# Patient Record
Sex: MALE | Race: WHITE | NOT HISPANIC OR LATINO | Employment: UNEMPLOYED | URBAN - NONMETROPOLITAN AREA
[De-identification: names, ages, dates, MRNs, and addresses within clinical notes are randomized per-mention and may not be internally consistent; named-entity substitution may affect disease eponyms.]

---

## 2018-05-31 ENCOUNTER — APPOINTMENT (OUTPATIENT)
Dept: GENERAL RADIOLOGY | Facility: HOSPITAL | Age: 23
End: 2018-05-31

## 2018-05-31 ENCOUNTER — HOSPITAL ENCOUNTER (EMERGENCY)
Facility: HOSPITAL | Age: 23
Discharge: HOME | End: 2018-05-31
Attending: EMERGENCY MEDICINE | Admitting: EMERGENCY MEDICINE

## 2018-05-31 VITALS
BODY MASS INDEX: 19.26 KG/M2 | TEMPERATURE: 99 F | SYSTOLIC BLOOD PRESSURE: 122 MMHG | RESPIRATION RATE: 16 BRPM | HEIGHT: 69 IN | DIASTOLIC BLOOD PRESSURE: 75 MMHG | WEIGHT: 130 LBS | OXYGEN SATURATION: 99 % | HEART RATE: 65 BPM

## 2018-05-31 DIAGNOSIS — S62.002A CLOSED NONDISPLACED FRACTURE OF SCAPHOID OF LEFT WRIST, UNSPECIFIED PORTION OF SCAPHOID, INITIAL ENCOUNTER: Primary | ICD-10-CM

## 2018-05-31 PROCEDURE — 73110 X-RAY EXAM OF WRIST: CPT

## 2018-05-31 PROCEDURE — 73130 X-RAY EXAM OF HAND: CPT

## 2018-05-31 PROCEDURE — 99283 EMERGENCY DEPT VISIT LOW MDM: CPT

## 2018-05-31 PROCEDURE — 73080 X-RAY EXAM OF ELBOW: CPT

## 2018-05-31 RX ORDER — HYDROCODONE BITARTRATE AND ACETAMINOPHEN 7.5; 325 MG/1; MG/1
1 TABLET ORAL ONCE
Status: COMPLETED | OUTPATIENT
Start: 2018-05-31 | End: 2018-05-31

## 2018-05-31 RX ORDER — HYDROCODONE BITARTRATE AND ACETAMINOPHEN 5; 325 MG/1; MG/1
1 TABLET ORAL EVERY 6 HOURS PRN
Qty: 12 TABLET | Refills: 0 | Status: SHIPPED | OUTPATIENT
Start: 2018-05-31 | End: 2018-06-04 | Stop reason: SDUPTHER

## 2018-05-31 RX ADMIN — HYDROCODONE BITARTRATE AND ACETAMINOPHEN 1 TABLET: 7.5; 325 TABLET ORAL at 11:08

## 2018-06-04 ENCOUNTER — OFFICE VISIT (OUTPATIENT)
Dept: ORTHOPEDIC SURGERY | Facility: CLINIC | Age: 23
End: 2018-06-04

## 2018-06-04 VITALS — HEIGHT: 69 IN | BODY MASS INDEX: 18.81 KG/M2 | WEIGHT: 127 LBS

## 2018-06-04 DIAGNOSIS — M25.532 LEFT WRIST PAIN: Primary | ICD-10-CM
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
## 2018-06-04 DIAGNOSIS — S62.015A CLOSED NONDISPLACED FRACTURE OF DISTAL POLE OF SCAPHOID BONE OF LEFT WRIST, INITIAL ENCOUNTER: ICD-10-CM

## 2018-06-04 PROCEDURE — 99204 OFFICE O/P NEW MOD 45 MIN: CPT | Performed by: NURSE PRACTITIONER

## 2018-06-04 PROCEDURE — 25622 CLTX CARPL SCPHD FX W/O MNPJ: CPT | Performed by: NURSE PRACTITIONER

## 2018-06-04 RX ORDER — HYDROCODONE BITARTRATE AND ACETAMINOPHEN 5; 325 MG/1; MG/1
1 TABLET ORAL EVERY 6 HOURS PRN
Qty: 30 TABLET | Refills: 0 | Status: SHIPPED | OUTPATIENT
Start: 2018-06-04 | End: 2018-06-07

## 2018-06-04 NOTE — PROGRESS NOTES
Miguelito Flores is a 23 y.o. male   Primary provider:  No Known Provider       Chief Complaint   Patient presents with   • Left Wrist - Pain, Fall       HISTORY OF PRESENT ILLNESS:    23-year-old male patient presents to office for evaluation of left wrist pain/injury. Initial injury occurred on 5/29/2018 due to a fall from a skateboard onto concrete. Patient was seen in the ED on 5/31/2018 with x-rays done and a thumb spica Exosplint placed. He was prescribed Norco for pain. Pain is described as constant and severe. Pain is described as stabbing and aching in nature. Pain is worse with movement/use of his left wrist. Pain improves with splinting, rest and pain medication.       Fall   The accident occurred 5 to 7 days ago (5/29/2018). The fall occurred while recreating/playing (fall from a skateboard). He landed on concrete. There was no blood loss. The point of impact was the left wrist. The pain is present in the left wrist and left hand. The pain is at a severity of 8/10. The pain is severe. The symptoms are aggravated by use of injured limb, pressure on injury, rotation and movement (driving. ). Associated symptoms comments: Aching, stabbing pain. . He has tried rest and immobilization (Norco, thumb spica splint) for the symptoms. The treatment provided mild relief.        CONCURRENT MEDICAL HISTORY:    Past Medical History:   Diagnosis Date   • Closed nondisplaced fracture of distal pole of navicular bone of left wrist 6/4/2018       No Known Allergies      Current Outpatient Prescriptions:   •  HYDROcodone-acetaminophen (NORCO) 5-325 MG per tablet, Take 1 tablet by mouth Every 6 (Six) Hours As Needed for Mild Pain  for up to 3 days., Disp: 30 tablet, Rfl: 0    History reviewed. No pertinent surgical history.    History reviewed. No pertinent family history.     Social History     Social History   • Marital status: Single     Spouse name: N/A   • Number of children: N/A   • Years of education: N/A  "    Occupational History   • Not on file.     Social History Main Topics   • Smoking status: Current Every Day Smoker     Packs/day: 1.00     Types: Cigarettes   • Smokeless tobacco: Never Used   • Alcohol use Yes      Comment: socially   • Drug use: Unknown   • Sexual activity: No     Other Topics Concern   • Not on file     Social History Narrative   • No narrative on file        Review of Systems   Constitutional: Negative.    HENT: Negative.    Eyes: Negative.    Respiratory: Negative.    Cardiovascular: Negative.    Gastrointestinal: Negative.    Endocrine: Negative.    Genitourinary: Negative.    Musculoskeletal: Positive for arthralgias.        Left wrist pain.    Skin: Negative.    Allergic/Immunologic: Negative.    Neurological: Negative.    Hematological: Negative.    Psychiatric/Behavioral: Negative.        PHYSICAL EXAMINATION:       Ht 175.3 cm (69\")   Wt 57.6 kg (127 lb)   BMI 18.75 kg/m²     Physical Exam   Constitutional: He is oriented to person, place, and time. Vital signs are normal. He appears well-developed and well-nourished. He is active and cooperative. He does not appear ill. No distress.   HENT:   Head: Normocephalic.   Pulmonary/Chest: Effort normal. No respiratory distress.   Abdominal: Soft. He exhibits no distension.   Musculoskeletal: He exhibits tenderness (Left wrist, snuffbox tenderness). He exhibits no edema or deformity.   Neurological: He is alert and oriented to person, place, and time. GCS eye subscore is 4. GCS verbal subscore is 5. GCS motor subscore is 6.   Skin: Skin is warm, dry and intact. Capillary refill takes less than 2 seconds. No erythema.   Psychiatric: He has a normal mood and affect. His speech is normal and behavior is normal. Judgment and thought content normal. Cognition and memory are normal.   Vitals reviewed.      GAIT:     [x]  Normal  []  Antalgic    Assistive device: [x]  None  []  Walker     []  Crutches  []  Cane     []  Wheelchair  []  " Stretcher    Right Hand Exam     Tenderness   The patient is experiencing no tenderness.         Range of Motion   The patient has normal right wrist ROM.     Muscle Strength   The patient has normal right wrist strength.    Other   Erythema: absent  Sensation: normal  Pulse: present      Left Hand Exam     Tenderness   The patient is experiencing tenderness in the snuff box and radial area.     Range of Motion     Wrist   Extension: abnormal   Flexion: abnormal   Pronation: abnormal   Supination: abnormal     Muscle Strength   Wrist Extension: 4/5   Wrist Flexion: 4/5   :  3/5     Other   Erythema: absent  Sensation: normal  Pulse: present    Comments:  Pain with range of motion. No swelling. No deformity. No ecchymosis. Skin is intact. Capillary refill is less than 3 seconds.             Xr Elbow 3+ View Left    Result Date: 5/31/2018  Narrative: Procedure:  Left elbow    Indication:  Trauma, pain   . Technique:  Three views   . Prior relevant exam:  None.   No evidence of acute bony, soft tissue, or joint abnormality is noted. Bone mineralization is within normal limits. The visualized joint spaces appear intact. Normal left elbow.     Impression: CONCLUSION: 1.  Normal left elbow. No acute traumatic changes.   Electronically signed by:  Elliott Degroot MD  5/31/2018 11:30 AM CDT Workstation: [id number]    Xr Wrist 3+ View Left    Result Date: 5/31/2018  Narrative: Procedure:  Left hand. Left wrist.    Indication:  Trauma, pain   . Technique:  Four views left wrist. Three views left hand.   . Prior relevant exam:  None. There is subtle increased linear sclerosis distal aspect the carpal navicular possibly nondisplaced impacted fracture. For further evaluation consider MRI left wrist. Left wrist is otherwise unremarkable. Normal left hand.     Impression: CONCLUSION: Subtle increased area of linear sclerosis distal aspect of the carpal navicular possibly nondisplaced, impacted fracture. For further evaluation  consider MRI left wrist. Left wrist is otherwise unremarkable. Normal left hand. Electronically signed by:  Elliott Degroot MD  5/31/2018 11:29 AM CDT Workstation: [id number]    Xr Hand 3+ View Left    Result Date: 5/31/2018  Narrative: Procedure:  Left hand. Left wrist.    Indication:  Trauma, pain   . Technique:  Four views left wrist. Three views left hand.   . Prior relevant exam:  None. There is subtle increased linear sclerosis distal aspect the carpal navicular possibly nondisplaced impacted fracture. For further evaluation consider MRI left wrist. Left wrist is otherwise unremarkable. Normal left hand.     Impression: CONCLUSION: Subtle increased area of linear sclerosis distal aspect of the carpal navicular possibly nondisplaced, impacted fracture. For further evaluation consider MRI left wrist. Left wrist is otherwise unremarkable. Normal left hand. Electronically signed by:  Elliott Degroot MD  5/31/2018 11:29 AM CDT Workstation: [id number]    ASSESSMENT:    Diagnoses and all orders for this visit:    Left wrist pain  -     MRI Wrist Left Without Contrast; Future    Fall from skateboard, initial encounter  -     MRI Wrist Left Without Contrast; Future    Closed nondisplaced fracture of distal pole of scaphoid bone of left wrist, initial encounter  -     MRI Wrist Left Without Contrast; Future    Other orders  -     HYDROcodone-acetaminophen (NORCO) 5-325 MG per tablet; Take 1 tablet by mouth Every 6 (Six) Hours As Needed for Mild Pain  for up to 3 days.    PLAN    X-rays of left wrist and hand done in the ED on 5/31/2018 are reviewed today. Patient appears to have a nondisplaced and impacted scaphoid fracture. Recommend MRI for further evaluation/confirmation of scaphoid fracture. There is also a concern for a possible ligament injury. Recommend to continue with thumb spica Exosplint for now. Norco is refilled today per patient's request to take as needed for moderate to severe pain. Patient is instructed to take  Ibuprofen as well, either in conjunction with pain medication or alone for milder pain. Recommend ice therapy intermittently to the left wrist 3 to 4 times daily for 20 minutes at a time to minimize pain. Follow up after MRI and possible casting if MRI positive/confirms scaphoid fracture.     This patient has tried and failed a course of multiple treatment modalities the use of anti-inflammatories/acetaminophen, splinting and rest and has sustained a traumatic injury resulting in a fracture. Therefore, I will recommend a course of narcotic pain medication for this patient until their pain has been sufficiently reduced to a level that I deem acceptable to be treated with alternative treatment options. [facility] reviewed # [id number].     Return for after MRI.      This document has been electronically signed by MILLIE Najera on June 4, 2018 6:38 PM      MILLIE Najera

## 2018-06-11 ENCOUNTER — HOSPITAL ENCOUNTER (EMERGENCY)
Facility: HOSPITAL | Age: 23
Discharge: HOME | End: 2018-06-11
Attending: EMERGENCY MEDICINE | Admitting: EMERGENCY MEDICINE

## 2018-06-11 VITALS
TEMPERATURE: 98.6 F | BODY MASS INDEX: 18.51 KG/M2 | HEART RATE: 69 BPM | DIASTOLIC BLOOD PRESSURE: 70 MMHG | HEIGHT: 69 IN | WEIGHT: 125 LBS | SYSTOLIC BLOOD PRESSURE: 121 MMHG | OXYGEN SATURATION: 100 % | RESPIRATION RATE: 16 BRPM

## 2018-06-11 DIAGNOSIS — S62.002A CLOSED NONDISPLACED FRACTURE OF SCAPHOID OF LEFT WRIST, UNSPECIFIED PORTION OF SCAPHOID, INITIAL ENCOUNTER: Primary | ICD-10-CM

## 2018-06-11 PROCEDURE — 99282 EMERGENCY DEPT VISIT SF MDM: CPT

## 2018-06-11 RX ORDER — PREDNISONE 20 MG/1
40 TABLET ORAL DAILY
Qty: 10 TABLET | Refills: 0 | OUTPATIENT
Start: 2018-06-11 | End: 2019-04-29

## 2018-06-11 RX ORDER — OXYCODONE HYDROCHLORIDE AND ACETAMINOPHEN 5; 325 MG/1; MG/1
1 TABLET ORAL EVERY 6 HOURS PRN
Qty: 12 TABLET | Refills: 0 | OUTPATIENT
Start: 2018-06-11 | End: 2019-04-29

## 2018-06-11 NOTE — ED PROVIDER NOTES
Subjective   Patient presents for recheck on the scaphoid fracture from 3 weeks ago.  Patient has a seen orthopedics for this and he has been out of the splint for about a week.  Patient notes some paresthesias in the distal fingers and increased pain to the wrist.  Patient also notes some temperature instability in the hand with it being very cold at times.  Patient is scheduled for an MRI, which is been ordered but actual test is been scheduled yet.  Patient presents the ED to get an MRI.            Review of Systems   Constitutional: Negative.  Negative for appetite change, chills and fever.   HENT: Negative.  Negative for congestion.    Eyes: Negative.  Negative for photophobia and visual disturbance.   Respiratory: Negative.  Negative for cough, chest tightness and shortness of breath.    Cardiovascular: Negative.  Negative for chest pain and palpitations.   Gastrointestinal: Negative.  Negative for abdominal pain, constipation, diarrhea, nausea and vomiting.   Endocrine: Negative.    Genitourinary: Negative.  Negative for decreased urine volume, dysuria, flank pain and hematuria.   Musculoskeletal: Negative.  Negative for arthralgias, back pain, myalgias, neck pain and neck stiffness.   Skin: Negative.  Negative for pallor.   Neurological: Negative.  Negative for dizziness, syncope, weakness, light-headedness, numbness and headaches.   Psychiatric/Behavioral: Negative.  Negative for confusion and suicidal ideas. The patient is not nervous/anxious.        Past Medical History:   Diagnosis Date   • Closed nondisplaced fracture of distal pole of navicular bone of left wrist 6/4/2018       No Known Allergies    History reviewed. No pertinent surgical history.    No family history on file.    Social History     Social History   • Marital status: Single     Social History Main Topics   • Smoking status: Current Every Day Smoker     Packs/day: 1.00     Types: Cigarettes   • Smokeless tobacco: Never Used   • Alcohol  use Yes      Comment: socially   • Drug use: Unknown   • Sexual activity: No     Other Topics Concern   • Not on file           Objective   Physical Exam   Constitutional: He is oriented to person, place, and time. He appears well-developed and well-nourished. No distress.   HENT:   Head: Normocephalic and atraumatic.   Eyes: Conjunctivae and EOM are normal.   Neck: Normal range of motion. Neck supple. No JVD present.   Cardiovascular: Normal rate, regular rhythm, normal heart sounds and intact distal pulses.  Exam reveals no gallop and no friction rub.    No murmur heard.  Pulmonary/Chest: Effort normal. No respiratory distress. He has no wheezes. He has no rales. He exhibits no tenderness.   Abdominal: Soft. Bowel sounds are normal. He exhibits no distension and no mass. There is no tenderness. There is no rebound and no guarding.   Musculoskeletal: Normal range of motion.   Patient with tenderness to the flexion-extension of the wrist.  There is no significant edema noted.  Patient currently without the temperature instability sensations that he has.  Patient is less than 2 second capillary refill.  Good ulnar and radial pulses.  No neurologic deficits of the hand.   Neurological: He is alert and oriented to person, place, and time.   Skin: Skin is warm and dry.   Psychiatric: He has a normal mood and affect. His behavior is normal. Judgment and thought content normal.   Nursing note and vitals reviewed.      Procedures           ED Course      Patient informed to follow up with orthopedics to schedule MRI.  Patient is not doing well on the Norco secondary to migraines, patient changed to Percocet for the same.              MDM      Final diagnoses:   Closed nondisplaced fracture of scaphoid of left wrist, unspecified portion of scaphoid, initial encounter            Yan Templeton MD  06/11/18 [id number]

## 2018-07-09 ENCOUNTER — HOSPITAL ENCOUNTER (EMERGENCY)
Facility: HOSPITAL | Age: 23
Discharge: HOME | End: 2018-07-09
Attending: EMERGENCY MEDICINE | Admitting: EMERGENCY MEDICINE

## 2018-07-09 VITALS
TEMPERATURE: 98 F | OXYGEN SATURATION: 99 % | RESPIRATION RATE: 18 BRPM | HEART RATE: 89 BPM | WEIGHT: 125 LBS | HEIGHT: 69 IN | DIASTOLIC BLOOD PRESSURE: 79 MMHG | BODY MASS INDEX: 18.51 KG/M2 | SYSTOLIC BLOOD PRESSURE: 105 MMHG

## 2018-07-09 DIAGNOSIS — S81.811A LEG LACERATION, RIGHT, INITIAL ENCOUNTER: Primary | ICD-10-CM

## 2018-07-09 PROCEDURE — 25010000002 TDAP 5-2.5-18.5 LF-MCG/0.5 SUSPENSION: Performed by: EMERGENCY MEDICINE

## 2018-07-09 PROCEDURE — 90471 IMMUNIZATION ADMIN: CPT | Performed by: EMERGENCY MEDICINE

## 2018-07-09 PROCEDURE — 96372 THER/PROPH/DIAG INJ SC/IM: CPT

## 2018-07-09 PROCEDURE — 99283 EMERGENCY DEPT VISIT LOW MDM: CPT

## 2018-07-09 PROCEDURE — 90715 TDAP VACCINE 7 YRS/> IM: CPT | Performed by: EMERGENCY MEDICINE

## 2018-07-09 PROCEDURE — 25010000002 KETOROLAC TROMETHAMINE PER 15 MG: Performed by: EMERGENCY MEDICINE

## 2018-07-09 RX ORDER — CEPHALEXIN 500 MG/1
500 CAPSULE ORAL 4 TIMES DAILY
Qty: 20 CAPSULE | Refills: 0 | OUTPATIENT
Start: 2018-07-09 | End: 2019-04-29

## 2018-07-09 RX ORDER — KETOROLAC TROMETHAMINE 30 MG/ML
30 INJECTION, SOLUTION INTRAMUSCULAR; INTRAVENOUS ONCE
Status: COMPLETED | OUTPATIENT
Start: 2018-07-09 | End: 2018-07-09

## 2018-07-09 RX ORDER — LIDOCAINE HYDROCHLORIDE 10 MG/ML
INJECTION, SOLUTION EPIDURAL; INFILTRATION; INTRACAUDAL; PERINEURAL
Status: DISCONTINUED
Start: 2018-07-09 | End: 2018-07-09 | Stop reason: WASHOUT

## 2018-07-09 RX ORDER — IBUPROFEN 800 MG/1
800 TABLET ORAL EVERY 6 HOURS PRN
Qty: 20 TABLET | Refills: 0 | Status: SHIPPED | OUTPATIENT
Start: 2018-07-09 | End: 2019-04-29 | Stop reason: SDUPTHER

## 2018-07-09 RX ORDER — LIDOCAINE HYDROCHLORIDE 20 MG/ML
10 INJECTION, SOLUTION INFILTRATION; PERINEURAL ONCE
Status: COMPLETED | OUTPATIENT
Start: 2018-07-09 | End: 2018-07-09

## 2018-07-09 RX ORDER — IBUPROFEN 800 MG/1
800 TABLET ORAL ONCE
Status: DISCONTINUED | OUTPATIENT
Start: 2018-07-09 | End: 2018-07-09 | Stop reason: HOSPADM

## 2018-07-09 RX ADMIN — TETANUS TOXOID, REDUCED DIPHTHERIA TOXOID AND ACELLULAR PERTUSSIS VACCINE, ADSORBED 0.5 ML: 5; 2.5; 8; 8; 2.5 SUSPENSION INTRAMUSCULAR at 07:43

## 2018-07-09 RX ADMIN — LIDOCAINE HYDROCHLORIDE 10 ML: 20 INJECTION, SOLUTION INFILTRATION; PERINEURAL at 07:10

## 2018-07-09 RX ADMIN — KETOROLAC TROMETHAMINE 30 MG: 30 INJECTION, SOLUTION INTRAMUSCULAR; INTRAVENOUS at 07:44

## 2018-07-09 NOTE — ED PROVIDER NOTES
Subjective     Laceration   Location:  Leg  Leg laceration location:  R lower leg  Depth:  Through underlying tissue  Quality: straight    Bleeding: controlled    Time since incident:  30 minutes  Laceration mechanism:  Metal edge  Pain details:     Quality:  Aching    Severity:  Moderate    Timing:  Constant    Progression:  Unchanged  Foreign body present:  No foreign bodies  Relieved by:  Nothing  Worsened by:  Nothing  Ineffective treatments:  None tried  Tetanus status:  Unknown  Associated symptoms: no fever, no numbness, no rash, no swelling and no streaking        Review of Systems   Constitutional: Negative for chills, fatigue and fever.   HENT: Negative for congestion and sore throat.    Eyes: Negative for visual disturbance.   Respiratory: Negative for cough and shortness of breath.    Cardiovascular: Negative for chest pain and leg swelling.   Gastrointestinal: Negative for abdominal pain, nausea and vomiting.   Genitourinary: Negative for dysuria.   Musculoskeletal: Negative for back pain.   Skin: Negative for rash.   Neurological: Negative for dizziness and weakness.   Psychiatric/Behavioral: Negative for behavioral problems.   All other systems reviewed and are negative.      Past Medical History:   Diagnosis Date   • Closed nondisplaced fracture of distal pole of navicular bone of left wrist 6/4/2018       No Known Allergies    No past surgical history on file.    No family history on file.    Social History     Social History   • Marital status: Single     Social History Main Topics   • Smoking status: Current Every Day Smoker     Packs/day: 1.00     Types: Cigarettes   • Smokeless tobacco: Never Used   • Alcohol use Yes      Comment: socially   • Drug use: Unknown   • Sexual activity: No     Other Topics Concern   • Not on file           Objective   Physical Exam   Constitutional: He is oriented to person, place, and time. He appears well-developed and well-nourished.   HENT:   Head: Normocephalic  and atraumatic.   Eyes: EOM are normal. Pupils are equal, round, and reactive to light.   Neck: Normal range of motion. Neck supple.   No midline tenderness   Cardiovascular: Normal rate, regular rhythm, normal heart sounds and intact distal pulses.  Exam reveals no gallop and no friction rub.    No murmur heard.  Pulmonary/Chest: Breath sounds normal. No respiratory distress. He has no wheezes. He has no rales.   No rhonchi   Abdominal: Soft. Bowel sounds are normal. He exhibits no distension. There is no tenderness.   Musculoskeletal: Normal range of motion. He exhibits no tenderness or deformity.   Neurological: He is alert and oriented to person, place, and time. No cranial nerve deficit. He exhibits normal muscle tone. Coordination normal.   Skin: Skin is warm and dry.   6cm laceration to the anterior aspect of the R shin.  Bleeding appears to be controlled there is no foreign bodies.  The patient's neurovascular intact distal to the injury.   Psychiatric: He has a normal mood and affect. His behavior is normal.       Laceration Repair  Date/Time: 7/9/2018 6:49 AM  Performed by: ANTWAN THACKER  Authorized by: ANTWAN THACKER     Consent:     Consent obtained:  Verbal    Alternatives discussed:  No treatment  Anesthesia (see MAR for exact dosages):     Anesthesia method:  Local infiltration    Local anesthetic:  Lidocaine 2% w/o epi  Laceration details:     Location:  Leg    Leg location:  R lower leg    Length (cm):  6    Depth (mm):  2  Repair type:     Repair type:  Simple  Pre-procedure details:     Preparation:  Patient was prepped and draped in usual sterile fashion  Exploration:     Wound exploration: entire depth of wound probed and visualized      Contaminated: no    Treatment:     Area cleansed with:  Saline    Amount of cleaning:  Extensive    Irrigation solution:  Sterile saline  Skin repair:     Repair method:  Staples    Number of staples:  7  Approximation:     Approximation:  Close    Vermilion  border: well-aligned    Post-procedure details:     Dressing:  Antibiotic ointment    Patient tolerance of procedure:  Tolerated well, no immediate complications                 ED Course                  MDM        Final diagnoses:   Leg laceration, right, initial encounter            Spike Buck MD  07/09/18 0709

## 2018-07-09 NOTE — ED NOTES
Pt is presented to ED with c/o right leg pain and laceration.  Pt states he was walking and fell hurting his right leg.  Pt states this happened pta.     Suzy Park RN  07/09/18 0601

## 2018-07-09 NOTE — ED NOTES
Pt returned to nurses station after being discharged to notify us that wound had started bleeding again. Pt led back to room where his wound was cleaned & dressed     Ebenezer Rodriguez RN  07/09/18 0896

## 2018-07-09 NOTE — ED NOTES
Attempted to d/c pt, pt requested a different pain medication. I will ask MD when he is available     Ebenezer Rodriguez, RN  07/09/18 2036

## 2018-07-16 ENCOUNTER — HOSPITAL ENCOUNTER (EMERGENCY)
Facility: HOSPITAL | Age: 23
Discharge: HOME | End: 2018-07-16
Attending: EMERGENCY MEDICINE | Admitting: EMERGENCY MEDICINE

## 2018-07-16 VITALS
WEIGHT: 125 LBS | DIASTOLIC BLOOD PRESSURE: 100 MMHG | RESPIRATION RATE: 18 BRPM | TEMPERATURE: 97.7 F | HEIGHT: 69 IN | BODY MASS INDEX: 18.51 KG/M2 | OXYGEN SATURATION: 100 % | HEART RATE: 64 BPM | SYSTOLIC BLOOD PRESSURE: 128 MMHG

## 2018-07-16 DIAGNOSIS — Z48.02 VISIT FOR SUTURE REMOVAL: Primary | ICD-10-CM

## 2018-07-16 PROCEDURE — 99201: CPT

## 2019-04-29 ENCOUNTER — APPOINTMENT (OUTPATIENT)
Dept: GENERAL RADIOLOGY | Facility: HOSPITAL | Age: 24
End: 2019-04-29

## 2019-04-29 ENCOUNTER — HOSPITAL ENCOUNTER (EMERGENCY)
Facility: HOSPITAL | Age: 24
Discharge: HOME | End: 2019-04-29
Attending: FAMILY MEDICINE | Admitting: FAMILY MEDICINE

## 2019-04-29 VITALS
DIASTOLIC BLOOD PRESSURE: 78 MMHG | HEIGHT: 69 IN | HEART RATE: 90 BPM | SYSTOLIC BLOOD PRESSURE: 130 MMHG | OXYGEN SATURATION: 98 % | WEIGHT: 130 LBS | BODY MASS INDEX: 19.26 KG/M2 | TEMPERATURE: 98.1 F | RESPIRATION RATE: 16 BRPM

## 2019-04-29 DIAGNOSIS — M25.531 RIGHT WRIST PAIN: Primary | ICD-10-CM

## 2019-04-29 PROCEDURE — 99283 EMERGENCY DEPT VISIT LOW MDM: CPT

## 2019-04-29 PROCEDURE — 73130 X-RAY EXAM OF HAND: CPT

## 2019-04-29 PROCEDURE — 73110 X-RAY EXAM OF WRIST: CPT

## 2019-04-29 RX ORDER — CYCLOBENZAPRINE HCL 10 MG
10 TABLET ORAL ONCE
Status: COMPLETED | OUTPATIENT
Start: 2019-04-29 | End: 2019-04-29

## 2019-04-29 RX ORDER — ORPHENADRINE CITRATE 100 MG/1
100 TABLET, EXTENDED RELEASE ORAL 2 TIMES DAILY PRN
Qty: 20 TABLET | Refills: 0 | Status: SHIPPED | OUTPATIENT
Start: 2019-04-29 | End: 2019-05-21

## 2019-04-29 RX ORDER — IBUPROFEN 800 MG/1
800 TABLET ORAL EVERY 6 HOURS PRN
Qty: 20 TABLET | Refills: 0 | Status: SHIPPED | OUTPATIENT
Start: 2019-04-29 | End: 2019-05-21

## 2019-04-29 RX ORDER — IBUPROFEN 800 MG/1
800 TABLET ORAL ONCE
Status: COMPLETED | OUTPATIENT
Start: 2019-04-29 | End: 2019-04-29

## 2019-04-29 RX ADMIN — IBUPROFEN 800 MG: 800 TABLET ORAL at 17:36

## 2019-04-29 RX ADMIN — CYCLOBENZAPRINE HYDROCHLORIDE 10 MG: 10 TABLET, FILM COATED ORAL at 17:36

## 2019-05-02 ENCOUNTER — OFFICE VISIT (OUTPATIENT)
Dept: ORTHOPEDIC SURGERY | Facility: CLINIC | Age: 24
End: 2019-05-02

## 2019-05-02 VITALS — WEIGHT: 127 LBS | BODY MASS INDEX: 18.81 KG/M2 | HEIGHT: 69 IN
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
## 2019-05-02 DIAGNOSIS — M25.532 CHRONIC WRIST PAIN, LEFT: ICD-10-CM

## 2019-05-02 DIAGNOSIS — M25.531 ACUTE WRIST PAIN, RIGHT: ICD-10-CM

## 2019-05-02 DIAGNOSIS — G89.29 CHRONIC WRIST PAIN, LEFT: ICD-10-CM

## 2019-05-02 PROCEDURE — 99214 OFFICE O/P EST MOD 30 MIN: CPT | Performed by: ORTHOPAEDIC SURGERY

## 2019-05-02 NOTE — PROGRESS NOTES
Miguelito Flores is a 24 y.o. male   Primary provider:  Provider, No Known       Chief Complaint   Patient presents with   • Left Wrist - Pain   • Right Wrist - Pain   • Establish Care       HISTORY OF PRESENT ILLNESS: Patient being seen for bilateral wrist pain due to injury. X-rays done at . Patient reports pain 6/10 in left hand and 9/10 in right. Patient has not filled medication yet.   He has been having tenderness around his left wrist for approximately 6 months now.  He also reports having pain in his right wrist since April 13.  He describes occasional sharp stabbing pains.  He also describes relatively constant aching.  No numbness or tingling.  He reports a fall from his skateboard injuring his left wrist.  He was initially supposed to get an MRI after x-rays were taken but was unable to do so at the time.  He continues having pain with his left wrist.  He has had a new fall on April 13 involving pain in his right wrist as well.    Pain   This is a new problem. The current episode started 1 to 4 weeks ago. The problem occurs intermittently. Associated symptoms include headaches. Associated symptoms comments: Stabbing, aching, clicking, swelling. The symptoms are aggravated by standing and walking (sitting).        CONCURRENT MEDICAL HISTORY:    Past Medical History:   Diagnosis Date   • Closed nondisplaced fracture of distal pole of navicular bone of left wrist 6/4/2018   • Depression    • Sleep apnea        No Known Allergies      Current Outpatient Medications:   •  ibuprofen (ADVIL,MOTRIN) 800 MG tablet, Take 1 tablet by mouth Every 6 (Six) Hours As Needed for Moderate Pain ., Disp: 20 tablet, Rfl: 0  •  orphenadrine (NORFLEX) 100 MG 12 hr tablet, Take 1 tablet by mouth 2 (Two) Times a Day As Needed for Mild Pain ., Disp: 20 tablet, Rfl: 0    History reviewed. No pertinent surgical history.    Family History   Problem Relation Age of Onset   • Hypertension Other    • Diabetes Other        "  Social History     Socioeconomic History   • Marital status: Single     Spouse name: Not on file   • Number of children: Not on file   • Years of education: Not on file   • Highest education level: Not on file   Tobacco Use   • Smoking status: Current Every Day Smoker     Packs/day: 1.00     Years: 7.00     Pack years: 7.00     Types: Cigarettes   • Smokeless tobacco: Never Used   Substance and Sexual Activity   • Alcohol use: Yes     Comment: socially   • Drug use: Defer   • Sexual activity: No        Review of Systems   Neurological: Positive for headaches.   Psychiatric/Behavioral: Positive for sleep disturbance.   All other systems reviewed and are negative.      PHYSICAL EXAMINATION:       Ht 175.3 cm (69\")   Wt 57.6 kg (127 lb)   BMI 18.75 kg/m²     Physical Exam   Constitutional: He is oriented to person, place, and time. He appears well-developed and well-nourished.   Neurological: He is alert and oriented to person, place, and time.   Psychiatric: He has a normal mood and affect. His behavior is normal. Judgment and thought content normal.       GAIT:     [x]  Normal  []  Antalgic    Assistive device: [x]  None  []  Walker     []  Crutches  []  Cane     []  Wheelchair  []  Stretcher    Right Hand Exam     Tenderness   Right hand tenderness location: He is tender at the anatomic snuffbox as well as the scaphoid tubercle.  He has mild tenderness with flexion and extension of his wrist.  He especially has pain with slight flexion and ulnar deviation.    Muscle Strength   Wrist extension: 4/5   Wrist flexion: 4/5   : 4/5     Tests   Phalen’s Sign: negative  Tinel's sign (median nerve): negative    Other   Erythema: absent  Sensation: normal  Pulse: present    Comments:  He has good range of motion but he does have tenderness at the extremes and has mild limitation      Left Hand Exam     Tenderness   Left hand tenderness location: He is tender at the scaphoid tubercle as well as in the anatomic " snuffbox.  He has tenderness with all ranges of motion involving the wrist.     Muscle Strength   Wrist extension: 4/5   Wrist flexion: 4/5   :  4/5     Tests   Phalen’s Sign: negative  Tinel's sign (median nerve): negative    Other   Erythema: absent  Sensation: normal  Pulse: present    Comments:  He has some limitation in motion, has pain with wrist flexion and especially with ulnar deviation of the wrist.              Xr Hand 3+ View Right    Result Date: 4/29/2019  Narrative: Three view right hand HISTORY: Pain. Injury. AP, lateral and oblique views obtained. COMPARISON: None No fracture or dislocation. No other osseous or articular abnormality.     Impression: CONCLUSION: No fracture or dislocation [id number] Electronically signed by:  Spike Colorado MD  4/29/2019 3:34 PM CDT Workstation: [email or web address]    Xr Wrist 3+ View Bilateral    Result Date: 4/29/2019  Narrative: PROCEDURE: XR WRIST 3+ VW BILATERAL VIEWS:   4  views each INDICATION: Pain COMPARISON: None FINDINGS: Right wrist   - fracture: Minimal irregularity of the proximal row of carpal bones on the lateral view.   - alignment: WNL   - misc : No erosive lesions or definite soft tissue abnormality identified. Left wrist   - fracture: None   - alignment: WNL   - misc:  No erosive lesions or definite soft tissue abnormality identified     Impression: 1. Essentially negative examination of the left wrist 2. Minimal irregularity of the proximal row carpal bones on the lateral view of the right wrist, for which clinical correlation for point tenderness is suggested. Fracture cannot be entirely excluded, particularly if there is history of trauma. Clinical correlation needed. (Note:  if pain or symptoms persist beyond reasonable expectations and follow-up imaging is anticipated,  scintigraphic or cross sectional imaging (CT and/or MRI) is suggested, as is deemed clinically appropriate). Electronically signed by:  April Blair MD  4/29/2019 3:42 PM CDT  Workstation: [id number]          ASSESSMENT:    Diagnoses and all orders for this visit:    Fall from skateboard, initial encounter  -     Cancel: CT wrist left wo contrast; Future    Chronic wrist pain, left    Acute wrist pain, right          PLAN    He has an acute injury to his right wrist with pain along the anatomic snuffbox.  Scaphoid fracture is a possibility.  He needs repeat x-rays in 10 days and if he is  and has negative x-rays at that point then an MRI would be warranted to assess for occult fracture.    His left wrist shows some abnormality at the scaphoid waist consistent with a possible scaphoid fracture.  He has had 6 months of discomfort and tenderness.  He originally was going to have an MRI but was unable to do so.  At this point, 6 months or more after his injury, CT scan of left wrist would be the better option to show the bony position and integrity of the scaphoid.    He was encouraged to stay off of his skateboard at this point.  He was to continue wearing his thumb spica splint for his right wrist.    Patient's Body mass index is 18.75 kg/m². BMI is within normal parameters. No follow-up required..      Return in about 10 days (around 5/12/2019) for Recheck with repeat xrays right wrist including scaphoid view.    Grayson Germain MD

## 2019-05-03 DIAGNOSIS — S62.015A CLOSED NONDISPLACED FRACTURE OF DISTAL POLE OF SCAPHOID BONE OF LEFT WRIST, INITIAL ENCOUNTER: Primary | ICD-10-CM

## 2019-05-16 ENCOUNTER — HOSPITAL ENCOUNTER (OUTPATIENT)
Dept: CT IMAGING | Facility: HOSPITAL | Age: 24
Discharge: HOME | End: 2019-05-16
Admitting: ORTHOPAEDIC SURGERY

## 2019-05-16 DIAGNOSIS — S62.015A CLOSED NONDISPLACED FRACTURE OF DISTAL POLE OF SCAPHOID BONE OF LEFT WRIST, INITIAL ENCOUNTER: ICD-10-CM

## 2019-05-16 PROCEDURE — 73200 CT UPPER EXTREMITY W/O DYE: CPT

## 2019-05-20 DIAGNOSIS — M25.531 ACUTE WRIST PAIN, RIGHT: Primary | ICD-10-CM

## 2019-05-21 ENCOUNTER — OFFICE VISIT (OUTPATIENT)
Dept: ORTHOPEDIC SURGERY | Facility: CLINIC | Age: 24
End: 2019-05-21

## 2019-05-21 VITALS — BODY MASS INDEX: 19.12 KG/M2 | WEIGHT: 129.1 LBS | HEIGHT: 69 IN

## 2019-05-21 DIAGNOSIS — G89.29 CHRONIC WRIST PAIN, LEFT: ICD-10-CM

## 2019-05-21 DIAGNOSIS — M25.532 CHRONIC WRIST PAIN, LEFT: ICD-10-CM
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
## 2019-05-21 DIAGNOSIS — M25.531 ACUTE WRIST PAIN, RIGHT: Primary | ICD-10-CM

## 2019-05-21 DIAGNOSIS — S62.015D CLOSED NONDISPLACED FRACTURE OF DISTAL POLE OF SCAPHOID OF LEFT WRIST WITH ROUTINE HEALING, SUBSEQUENT ENCOUNTER: ICD-10-CM

## 2019-05-21 PROCEDURE — 99213 OFFICE O/P EST LOW 20 MIN: CPT | Performed by: ORTHOPAEDIC SURGERY

## 2019-05-21 RX ORDER — ORPHENADRINE CITRATE 100 MG/1
100 TABLET, EXTENDED RELEASE ORAL 2 TIMES DAILY PRN
Qty: 20 TABLET | Refills: 0 | Status: SHIPPED | OUTPATIENT
Start: 2019-05-21

## 2019-05-21 NOTE — PROGRESS NOTES
"Miguelito Flores is a 24 y.o. male returns for     Chief Complaint   Patient presents with   • Left Wrist - Follow-up   • Right Wrist - Follow-up   • Results     CT       HISTORY OF PRESENT ILLNESS: Patient being seen for bilateral wrist follow up. Right x-rays done today. Left wrist CT done at , would like to discuss findings.   Pain in his left wrist seems to be slightly improved.  He is continued to have pain in his right wrist with motion and with activity.  No numbness or tingling.  No new injury.       CONCURRENT MEDICAL HISTORY:    The following portions of the patient's history were reviewed and updated as appropriate: allergies, current medications, past family history, past medical history, past social history, past surgical history and problem list.     ROS  No fevers or chills.  No chest pain or shortness of air.  No GI or  disturbances.    PHYSICAL EXAMINATION:       Ht 175.3 cm (69\")   Wt 58.6 kg (129 lb 1.6 oz)   BMI 19.06 kg/m²     Physical Exam   Constitutional: He is oriented to person, place, and time. He appears well-developed and well-nourished.   Neurological: He is alert and oriented to person, place, and time.   Psychiatric: He has a normal mood and affect. His behavior is normal. Judgment and thought content normal.       GAIT:     [x]  Normal  []  Antalgic    Assistive device: [x]  None  []  Walker     []  Crutches  []  Cane     []  Wheelchair  []  Stretcher    Right Hand Exam     Tenderness   Right hand tenderness location: Mild tenderness in the anatomic snuffbox and tenderness on the scaphoid tubercle.    Muscle Strength   Wrist extension: 4/5   Wrist flexion: 4/5   : 4/5     Tests   Phalen’s Sign: negative  Tinel's sign (median nerve): negative    Other   Erythema: absent  Sensation: normal  Pulse: present    Comments:  He has good range of motion but he does have tenderness at the extremes and has mild limitation      Left Hand Exam     Tenderness   Left hand " tenderness location: He is tender at the scaphoid tubercle as well as in the anatomic snuffbox.  He has tenderness with all ranges of motion involving the wrist.     Muscle Strength   Wrist extension: 4/5   Wrist flexion: 4/5   :  4/5     Tests   Phalen’s Sign: negative  Tinel's sign (median nerve): negative    Other   Erythema: absent  Sensation: normal  Pulse: present    Comments:  Mild limitation with full flexion and extension.  Mild tenderness at the extremes of motion.              Xr Wrist 3+ View Right    Result Date: 5/21/2019  Narrative: Ordering Provider:  Grayson Germain MD Ordering Diagnosis/Indication:  Acute wrist pain, right Procedure:  XR WRIST 3+ VW RIGHT Exam Date:  5/21/19 COMPARISON:  Todays X-rays were compared to previous images dated April 29, 2019.     Impression:  3 views of the right wrist show acceptable position and alignment with no evidence of acute bony abnormality.  No fracture or dislocation is noted.  No interval change from prior x-ray.  If clinical suspicion is present then MRI of the wrist would be warranted to assess for occult fracture of the carpal navicular. Grayson Germain MD 5/21/19     Xr Hand 3+ View Right    Result Date: 4/29/2019  Narrative: Three view right hand HISTORY: Pain. Injury. AP, lateral and oblique views obtained. COMPARISON: None No fracture or dislocation. No other osseous or articular abnormality.     Impression: CONCLUSION: No fracture or dislocation [id number] Electronically signed by:  Spike Colorado MD  4/29/2019 3:34 PM CDT Workstation: [id number]    Ct Wrist Left Wo Contrast    Result Date: 5/17/2019  Narrative: CT left wrist CLINICAL INDICATION: Pain. Evaluate for carpal navicular fracture. COMPARISON: Left wrist April 29, 2019. TECHNIQUE: Noncontrast study. Helical scanning with axial and coronal reformations. Soft tissue, lung, and bone windows reviewed. This exam was performed according to our departmental dose-optimization  program, which includes automated exposure control, adjustment of the mA and/or kV according to patient size and/or use of iterative reconstruction technique.  CT FINDINGS: No evidence of fracture. The carpal navicular is unremarkable. There are no bony abnormalities. No soft tissue abnormalities are present.     Impression: CONCLUSION: Unremarkable CT left wrist. Electronically signed by:  lEliott Degroot MD  5/17/2019 10:02 AM CDT Workstation: [id number]    Xr Wrist 3+ View Bilateral    Result Date: 4/29/2019  Narrative: PROCEDURE: XR WRIST 3+ VW BILATERAL VIEWS:   4  views each INDICATION: Pain COMPARISON: None FINDINGS: Right wrist   - fracture: Minimal irregularity of the proximal row of carpal bones on the lateral view.   - alignment: WNL   - misc : No erosive lesions or definite soft tissue abnormality identified. Left wrist   - fracture: None   - alignment: WNL   - misc:  No erosive lesions or definite soft tissue abnormality identified     Impression: 1. Essentially negative examination of the left wrist 2. Minimal irregularity of the proximal row carpal bones on the lateral view of the right wrist, for which clinical correlation for point tenderness is suggested. Fracture cannot be entirely excluded, particularly if there is history of trauma. Clinical correlation needed. (Note:  if pain or symptoms persist beyond reasonable expectations and follow-up imaging is anticipated,  scintigraphic or cross sectional imaging (CT and/or MRI) is suggested, as is deemed clinically appropriate). Electronically signed by:  April Blair MD  4/29/2019 3:42 PM CDT Workstation: [id number]            ASSESSMENT:    Diagnoses and all orders for this visit:    Acute wrist pain, right  -     MRI Wrist Right Without Contrast; Future    Closed nondisplaced fracture of distal pole of scaphoid of left wrist with routine healing, subsequent encounter  -     MRI Wrist Right Without Contrast; Future    Fall from skateboard, subsequent  encounter  -     MRI Wrist Right Without Contrast; Future    Chronic wrist pain, left    Other orders  -     orphenadrine (NORFLEX) 100 MG 12 hr tablet; Take 1 tablet by mouth 2 (Two) Times a Day As Needed for Mild Pain .          PLAN    The CT scan of his left wrist did not show any occult or residual fractures.  He continues to have pain in his right wrist and especially at the anatomic snuffbox after a fall.  He has 2- x-rays.  MRI of his wrist is now warranted to ensure that he does not have an occult scaphoid fracture that would require immobilization.  We discussed continued use of the brace for support.  We discussed protected activity to limit risk of falling on his right wrist again.    Patient's Body mass index is 19.06 kg/m². BMI is within normal parameters. No follow-up required..      Return for recheck for MRI results.    Grayson Germain MD

## 2019-06-13 ENCOUNTER — TELEPHONE (OUTPATIENT)
Dept: GENERAL RADIOLOGY | Facility: HOSPITAL | Age: 24
End: 2019-06-13

## 2019-12-23 ENCOUNTER — HOSPITAL ENCOUNTER (EMERGENCY)
Facility: HOSPITAL | Age: 24
Discharge: HOME | End: 2019-12-23
Attending: FAMILY MEDICINE | Admitting: FAMILY MEDICINE

## 2019-12-23 VITALS
RESPIRATION RATE: 18 BRPM | TEMPERATURE: 97.3 F | SYSTOLIC BLOOD PRESSURE: 120 MMHG | HEIGHT: 69 IN | OXYGEN SATURATION: 98 % | BODY MASS INDEX: 18.66 KG/M2 | HEART RATE: 54 BPM | WEIGHT: 126 LBS | DIASTOLIC BLOOD PRESSURE: 77 MMHG

## 2019-12-23 DIAGNOSIS — H61.23 BILATERAL IMPACTED CERUMEN: Primary | ICD-10-CM

## 2019-12-23 PROCEDURE — 99282 EMERGENCY DEPT VISIT SF MDM: CPT

## 2019-12-23 NOTE — ED TRIAGE NOTES
Pt states he has a heavy amount of cerumen to both ears and frequently uses a cotton swab to clean them. Pt c/o pain to the left ear for 2-3 days.

## 2019-12-23 NOTE — ED PROVIDER NOTES
Subjective   Patient presents to emergency department for bilateral ear pain and fullness.  States left side hurts worse than right.  Denies fever/chills/sore throat.  To uses Q-tips in his ears frequently without improvement.      History provided by:  Patient   used: No    Earache   Location:  Bilateral  Onset quality:  Sudden  Duration:  1 week  Associated symptoms: hearing loss    Associated symptoms: no abdominal pain, no cough, no ear discharge, no fever, no headaches, no neck pain, no sore throat, no tinnitus and no vomiting    Risk factors: no chronic ear infection        Review of Systems   Constitutional: Negative for chills and fever.   HENT: Positive for ear pain and hearing loss. Negative for dental problem, ear discharge, sore throat and tinnitus.    Eyes: Negative for visual disturbance.   Respiratory: Negative for cough, shortness of breath and wheezing.    Gastrointestinal: Negative for abdominal pain, nausea and vomiting.   Genitourinary: Negative for dysuria and flank pain.   Musculoskeletal: Negative for back pain and neck pain.   Allergic/Immunologic: Negative for immunocompromised state.   Neurological: Negative for syncope and headaches.   Hematological: Does not bruise/bleed easily.   Psychiatric/Behavioral: Negative for confusion.       Past Medical History:   Diagnosis Date   • Closed nondisplaced fracture of distal pole of navicular bone of left wrist 6/4/2018   • Depression    • Sleep apnea        No Known Allergies    History reviewed. No pertinent surgical history.    Family History   Problem Relation Age of Onset   • Hypertension Other    • Diabetes Other        Social History     Socioeconomic History   • Marital status: Single     Spouse name: Not on file   • Number of children: Not on file   • Years of education: Not on file   • Highest education level: Not on file   Tobacco Use   • Smoking status: Current Every Day Smoker     Packs/day: 1.00     Years: 7.00      "Pack years: 7.00     Types: Cigarettes   • Smokeless tobacco: Never Used   Substance and Sexual Activity   • Alcohol use: Yes     Comment: socially   • Drug use: Defer   • Sexual activity: Never           Objective      /77 (BP Location: Right arm, Patient Position: Sitting)   Pulse 54   Temp 97.3 °F (36.3 °C) (Oral)   Resp 18   Ht 175.3 cm (69\")   Wt 57.2 kg (126 lb)   SpO2 98%   BMI 18.61 kg/m²     Physical Exam   Constitutional: He is oriented to person, place, and time. He appears well-developed and well-nourished. No distress.   HENT:   Head: Normocephalic and atraumatic.   Right Ear: Ear canal normal. No tenderness. No mastoid tenderness.   Left Ear: Ear canal normal. No tenderness. No mastoid tenderness.   Bilateral cerumen impaction.   Eyes: Conjunctivae are normal.   Cardiovascular: Normal rate, regular rhythm, normal heart sounds and intact distal pulses.   Pulmonary/Chest: Effort normal and breath sounds normal. No respiratory distress. He has no wheezes.   Abdominal: Soft. Bowel sounds are normal.   Musculoskeletal: He exhibits no edema.   Neurological: He is alert and oriented to person, place, and time.   Skin: Skin is warm and dry. Capillary refill takes less than 2 seconds.   Psychiatric: He has a normal mood and affect. His behavior is normal. Thought content normal.   Nursing note and vitals reviewed.      Procedures           ED Course      Discussed results with patient.  Gave educational materials.  Advised close follow up with ENT.  Return to emergency department for new or worsening symptoms.                    No data recorded                        MDM    Final diagnoses:   Bilateral impacted cerumen              Alexandr Rabago PA-C  12/23/19 1428    "